# Patient Record
Sex: FEMALE | Race: WHITE
[De-identification: names, ages, dates, MRNs, and addresses within clinical notes are randomized per-mention and may not be internally consistent; named-entity substitution may affect disease eponyms.]

---

## 2018-01-24 ENCOUNTER — HOSPITAL ENCOUNTER (EMERGENCY)
Dept: HOSPITAL 62 - ER | Age: 23
Discharge: HOME | End: 2018-01-24
Payer: MEDICAID

## 2018-01-24 VITALS — DIASTOLIC BLOOD PRESSURE: 65 MMHG | SYSTOLIC BLOOD PRESSURE: 104 MMHG

## 2018-01-24 DIAGNOSIS — R50.9: ICD-10-CM

## 2018-01-24 DIAGNOSIS — R52: ICD-10-CM

## 2018-01-24 DIAGNOSIS — R05: ICD-10-CM

## 2018-01-24 DIAGNOSIS — F17.200: ICD-10-CM

## 2018-01-24 DIAGNOSIS — R11.2: Primary | ICD-10-CM

## 2018-01-24 DIAGNOSIS — J02.9: ICD-10-CM

## 2018-01-24 DIAGNOSIS — R59.0: ICD-10-CM

## 2018-01-24 PROCEDURE — 99282 EMERGENCY DEPT VISIT SF MDM: CPT

## 2018-01-24 NOTE — ER DOCUMENT REPORT
ED Medical Screen (RME)





- General


Chief Complaint: Vomiting


Stated Complaint: VOMITING


Time Seen by Provider: 01/24/18 14:01


Mode of Arrival: Ambulatory


Information source: Patient


TRAVEL OUTSIDE OF THE [place] IN LAST 30 DAYS: No





- Related Data


Allergies/Adverse Reactions: 


 





No Known Allergies Allergy (Unverified 01/24/18 12:19)


 











Doctor's Discharge





- Discharge


Clinical Impression: 


 vomiting, Sore throat





Condition: Good


Disposition: HOME, SELF-CARE


Instructions:  Sore Throat ([facility]), Acetaminophen, Penicillin V K ([facility]), Family 

Physicians / Practices


Additional Instructions: 


return to ER if worse


plenty of fluids


tylenol for discomfort


rest


Prescriptions: 


Penicillin V Potassium [Penicillin Vk 500 mg Tablet] 500 mg PO QID #40 tablet


Forms:  Return to Work

## 2018-01-24 NOTE — ER DOCUMENT REPORT
HPI





- HPI


Onset: Yesterday


Pain Level: 4


Context: 





21 yo smoker, no drugs, occ. alcohol normally healthy female with vomiting 

yesterday with bodyaches.  When woke up today still has bodyaches. No vomiting 

or diarrhea today. Fever 101. Mild sore throat and rare cough. No abd. pain.   

LMP: 1-. No hormones. No dysuria. No vaginal discharge. Mild nausea.


Associated Symptoms: None


Exacerbated by: Denies


Relieved by: Denies


Similar symptoms previously: No


Recently seen / treated by doctor: No





- ROS


ROS below otherwise negative: Yes


Systems Reviewed and Negative: Yes All other systems reviewed and negative





Past Medical History





- General


Information source: Patient





- Social History


Smoking Status: Current Every Day Smoker


Frequency of alcohol use: Occasional


Drug Abuse: None


Lives with: Spouse/Significant other


Family History: Reviewed & Not Pertinent





- Medical History


Medical History: Negative


Psychiatric Medical History: Reports: Hx Attention Deficit Hyperactivity 

Disorder


Surgical Hx: Negative





Vertical Provider Document





- CONSTITUTIONAL


Agree With Documented VS: No - apical pulse 92 at this time


Exam Limitations: No Limitations


General Appearance: No Apparent Distress





- INFECTION CONTROL


TRAVEL OUTSIDE OF THE [place] IN LAST 30 DAYS: No





- HEENT


HEENT: Normocephalic, Pharyngeal Erythema - bright red.  negative: Conjuctival 

Injection, Tympanic Membrane Red





- NECK


Neck: Supple, Lymphadenopathy-Left - ant, Lymphadenopathy-Right - ant





- RESPIRATORY


Respiratory: Breath Sounds Normal, No Respiratory Distress


O2 Sat by Pulse Oximetry: 100





- CARDIOVASCULAR


Cardiovascular: Regular Rate, Regular Rhythm





- GI/ABDOMEN


Gastrointestinal: Abdomen Soft, Abdomen Non-Tender, No Organomegaly, Normal 

Bowel Sounds





- MUSCULOSKELETAL/EXTREMETIES


Musculoskeletal/Extremeties: MAEW





- NEURO


Level of Consciousness: Awake, Alert, Appropriate





- DERM


Integumentary: Warm, Dry, No Rash





Course





- Vital Signs


Vital signs: 


 











Temp Pulse Resp BP Pulse Ox


 


 98.6 F   119 H  16   104/65   100 


 


 01/24/18 12:49  01/24/18 12:49  01/24/18 12:49  01/24/18 12:49  01/24/18 12:49














Discharge





- Discharge


Clinical Impression: 


 vomiting, Sore throat





Condition: Good


Disposition: HOME, SELF-CARE


Instructions:  Acetaminophen, Family Physicians / Practices, Penicillin V K ([facility]

), Sore Throat ([facility])


Additional Instructions: 


return to ER if worse


plenty of fluids


tylenol for discomfort


rest


Prescriptions: 


Penicillin V Potassium [Penicillin Vk 500 mg Tablet] 500 mg PO QID #40 tablet


Forms:  Return to Work

## 2020-11-07 ENCOUNTER — HOSPITAL ENCOUNTER (EMERGENCY)
Dept: HOSPITAL 53 - M ED | Age: 25
Discharge: HOME | End: 2020-11-07
Payer: MEDICAID

## 2020-11-07 VITALS — WEIGHT: 125.66 LBS | HEIGHT: 71 IN | BODY MASS INDEX: 17.59 KG/M2

## 2020-11-07 VITALS — SYSTOLIC BLOOD PRESSURE: 103 MMHG | DIASTOLIC BLOOD PRESSURE: 56 MMHG

## 2020-11-07 DIAGNOSIS — Z3A.01: ICD-10-CM

## 2020-11-07 DIAGNOSIS — O36.8911: Primary | ICD-10-CM

## 2020-11-07 DIAGNOSIS — Z87.891: ICD-10-CM

## 2020-11-07 LAB
B-HCG SERPL-ACNC: 9248 MIU/ML
BACTERIA URNS QL MICRO: (no result)
BASOPHILS # BLD AUTO: 0 10^3/UL (ref 0–0.2)
BASOPHILS NFR BLD AUTO: 0.1 % (ref 0–1)
BILIRUB UR QL STRIP: (no result)
BUN SERPL-MCNC: 11 MG/DL (ref 7–18)
CALCIUM SERPL-MCNC: 8.8 MG/DL (ref 8.5–10.1)
CHLORIDE SERPL-SCNC: 104 MEQ/L (ref 98–107)
CO2 SERPL-SCNC: 28 MEQ/L (ref 21–32)
CREAT SERPL-MCNC: 0.77 MG/DL (ref 0.55–1.3)
EOSINOPHIL # BLD AUTO: 0.1 10^3/UL (ref 0–0.5)
EOSINOPHIL NFR BLD AUTO: 1 % (ref 0–3)
GFR SERPL CREATININE-BSD FRML MDRD: > 60 ML/MIN/{1.73_M2} (ref 60–?)
GLUCOSE SERPL-MCNC: 84 MG/DL (ref 70–100)
GLUCOSE UR STRIP-MCNC: NEGATIVE MG/DL
HCT VFR BLD AUTO: 36.3 % (ref 36–47)
HGB BLD-MCNC: 12.4 G/DL (ref 12–15.5)
HYALINE CASTS URNS QL MICRO: (no result) /LPF (ref 0–1)
KETONES UR QL STRIP: (no result) MG/DL
LYMPHOCYTES # BLD AUTO: 2 10^3/UL (ref 1.5–5)
LYMPHOCYTES NFR BLD AUTO: 26.3 % (ref 24–44)
MCH RBC QN AUTO: 32.6 PG (ref 27–33)
MCHC RBC AUTO-ENTMCNC: 34.2 G/DL (ref 32–36.5)
MCV RBC AUTO: 95.5 FL (ref 80–96)
MONOCYTES # BLD AUTO: 0.4 10^3/UL (ref 0–0.8)
MONOCYTES NFR BLD AUTO: 5.3 % (ref 0–5)
NEUTROPHILS # BLD AUTO: 5.2 10^3/UL (ref 1.5–8.5)
NEUTROPHILS NFR BLD AUTO: 66.9 % (ref 36–66)
PLATELET # BLD AUTO: 212 10^3/UL (ref 150–450)
POTASSIUM SERPL-SCNC: 3.8 MEQ/L (ref 3.5–5.1)
RBC # BLD AUTO: 3.8 10^6/UL (ref 4–5.4)
RBC #/AREA URNS HPF: (no result) /HPF (ref 0–3)
SODIUM SERPL-SCNC: 136 MEQ/L (ref 136–145)
SQUAMOUS URNS QL MICRO: (no result) /HPF
UROBILINOGEN UR QL STRIP: (no result) MG/DL
WBC # BLD AUTO: 7.8 10^3/UL (ref 4–10)

## 2020-11-07 NOTE — REP
INDICATION:

vaginal bleeding, + pregnancy.



COMPARISON:

None.



TECHNIQUE:

Transabdominal scanning



FINDINGS:

The uterus is anteverted.  It measures 9.2 x 5.7 by 6.2 cm.  This is a gestational sac

in the fundus.  Within it is a fetal pole with a crown-rump length of 2 mm

corresponding to 5 weeks 5 days.  Fetal heart activity at 88 BPM.  On the left lateral

aspect of the gestational sac is a subchorionic bleed measuring 2.1 x 1.7 x 1.1 cm.

There is a yolk sac evident.



No fluid in the endocervical canal suggested.  There is no free fluid in the

cul-de-sac.  Right ovary not observed.  The left ovary shows no mass or enlargement.

No adjacent fluid.



IMPRESSION:

1. Single intrauterine gestation at 5 weeks 5 days by composite ultrasound criteria

and LMP.  This gives an EDC of 07/05/2021.

2. Fetal heart activity 888 BPM

3. Subchorionic bleed to the left of the gestational sac 2.1 x 1.7 x 1.1 cm.  No other

findings.





<Electronically signed by Todd Mike > 11/07/20 [id number]

## 2020-11-17 ENCOUNTER — HOSPITAL ENCOUNTER (OUTPATIENT)
Dept: HOSPITAL 53 - M LAB REF | Age: 25
End: 2020-11-17
Attending: ADVANCED PRACTICE MIDWIFE
Payer: MEDICAID

## 2020-11-17 DIAGNOSIS — Z34.81: Primary | ICD-10-CM

## 2020-11-17 LAB
HBV SURFACE AB SER-ACNC: NEGATIVE M[IU]/ML
HCT VFR BLD AUTO: 38.3 % (ref 36–47)
HCV AB SER QL: < 0 INDEX (ref ?–0.8)
HGB BLD-MCNC: 12.7 G/DL (ref 12–15.5)
HIV 1+2 AB+HIV1 P24 AG SERPL QL IA: NEGATIVE
MCH RBC QN AUTO: 32.4 PG (ref 27–33)
MCHC RBC AUTO-ENTMCNC: 33.2 G/DL (ref 32–36.5)
MCV RBC AUTO: 97.7 FL (ref 80–96)
PLATELET # BLD AUTO: 218 10^3/UL (ref 150–450)
RBC # BLD AUTO: 3.92 10^6/UL (ref 4–5.4)
WBC # BLD AUTO: 7.6 10^3/UL (ref 4–10)

## 2020-11-20 ENCOUNTER — HOSPITAL ENCOUNTER (OUTPATIENT)
Dept: HOSPITAL 53 - M RAD | Age: 25
End: 2020-11-20
Attending: ADVANCED PRACTICE MIDWIFE
Payer: MEDICAID

## 2020-11-20 DIAGNOSIS — Z3A.01: ICD-10-CM

## 2020-11-20 DIAGNOSIS — O20.9: Primary | ICD-10-CM

## 2021-01-15 ENCOUNTER — HOSPITAL ENCOUNTER (OUTPATIENT)
Dept: HOSPITAL 53 - M LAB REF | Age: 26
End: 2021-01-15
Attending: ADVANCED PRACTICE MIDWIFE
Payer: MEDICAID

## 2021-01-15 DIAGNOSIS — Z34.82: Primary | ICD-10-CM

## 2021-02-16 ENCOUNTER — HOSPITAL ENCOUNTER (OUTPATIENT)
Dept: HOSPITAL 53 - M RAD | Age: 26
End: 2021-02-16
Attending: ADVANCED PRACTICE MIDWIFE
Payer: MEDICAID

## 2021-02-16 DIAGNOSIS — Z34.82: Primary | ICD-10-CM

## 2021-03-29 ENCOUNTER — HOSPITAL ENCOUNTER (OUTPATIENT)
Dept: HOSPITAL 53 - M RAD | Age: 26
End: 2021-03-29
Attending: ADVANCED PRACTICE MIDWIFE
Payer: COMMERCIAL

## 2021-03-29 DIAGNOSIS — Z34.92: Primary | ICD-10-CM

## 2021-03-29 DIAGNOSIS — Z3A.26: ICD-10-CM

## 2021-03-29 NOTE — REP
INDICATION:

ANATOMY F/U



COMPARISON:

02/16/2021



TECHNIQUE:

Transabdominal obstetrical ultrasound with color Doppler evaluation.



FINDINGS:

Examination demonstrates a single live intrauterine pregnancy in cephalic

presentation.  Fetal motion is identified by technologist.  Placenta is noted

anterior/right lateral and  grade 1 without evidence for placenta previa or abruption.

Amniotic fluid volume is normal.  Cervix measures 5.8 cm in length and appears closed..



Gestational age by LMP and 1st ultrasound 26 weeks 0 days with FANTASMA 07/05/2021.

Gestational age by current measurements 27 weeks 1 day with FANTASMA 06/27/2021.



FHR equals 133 beats per minute.



Estimated fetal weight 1024 grams (83rdpercentile).



Anatomical assessment demonstrates normal structures including left kidney, bladder,

and spine.  Two vessel cord again noted.



IMPRESSION:

1. Appropriate interval growth.

2. 2 vessel cord again noted.

3. Images of the spine now complete and normal.





<Electronically signed by Fabiano Swann > 03/29/21 [id number]

## 2021-04-14 ENCOUNTER — HOSPITAL ENCOUNTER (OUTPATIENT)
Dept: HOSPITAL 53 - M LAB | Age: 26
End: 2021-04-14
Attending: ADVANCED PRACTICE MIDWIFE
Payer: COMMERCIAL

## 2021-04-14 DIAGNOSIS — Z34.82: Primary | ICD-10-CM

## 2021-04-14 LAB
HCT VFR BLD AUTO: 33.4 % (ref 36–47)
HGB BLD-MCNC: 11.7 G/DL (ref 12–15.5)
MCH RBC QN AUTO: 33.9 PG (ref 27–33)
MCHC RBC AUTO-ENTMCNC: 35 G/DL (ref 32–36.5)
MCV RBC AUTO: 96.8 FL (ref 80–96)
PLATELET # BLD AUTO: 188 10^3/UL (ref 150–450)
RBC # BLD AUTO: 3.45 10^6/UL (ref 4–5.4)
WBC # BLD AUTO: 9.9 10^3/UL (ref 4–10)

## 2021-05-10 ENCOUNTER — HOSPITAL ENCOUNTER (EMERGENCY)
Dept: HOSPITAL 53 - M ED | Age: 26
Discharge: LEFT BEFORE DISCHARGE | End: 2021-05-10
Payer: COMMERCIAL

## 2021-05-10 VITALS
SYSTOLIC BLOOD PRESSURE: 112 MMHG | HEIGHT: 70 IN | BODY MASS INDEX: 20.99 KG/M2 | WEIGHT: 146.61 LBS | DIASTOLIC BLOOD PRESSURE: 57 MMHG

## 2021-05-10 DIAGNOSIS — Z53.21: Primary | ICD-10-CM

## 2021-06-03 ENCOUNTER — HOSPITAL ENCOUNTER (OUTPATIENT)
Dept: HOSPITAL 53 - M LAB REF | Age: 26
End: 2021-06-03
Attending: ADVANCED PRACTICE MIDWIFE
Payer: MEDICAID

## 2021-06-03 DIAGNOSIS — Z34.83: Primary | ICD-10-CM

## 2021-06-12 ENCOUNTER — HOSPITAL ENCOUNTER (OUTPATIENT)
Dept: HOSPITAL 53 - M LDO | Age: 26
Discharge: HOME | End: 2021-06-12
Attending: OBSTETRICS & GYNECOLOGY
Payer: MEDICAID

## 2021-06-12 VITALS — DIASTOLIC BLOOD PRESSURE: 53 MMHG | SYSTOLIC BLOOD PRESSURE: 98 MMHG

## 2021-06-12 VITALS — BODY MASS INDEX: 20.52 KG/M2 | WEIGHT: 146.61 LBS | HEIGHT: 71 IN

## 2021-06-12 VITALS — SYSTOLIC BLOOD PRESSURE: 112 MMHG | DIASTOLIC BLOOD PRESSURE: 64 MMHG

## 2021-06-12 VITALS — DIASTOLIC BLOOD PRESSURE: 59 MMHG | SYSTOLIC BLOOD PRESSURE: 115 MMHG

## 2021-06-12 DIAGNOSIS — Z3A.36: ICD-10-CM

## 2021-06-12 DIAGNOSIS — O47.03: Primary | ICD-10-CM

## 2021-06-12 NOTE — IPNPDOC
Text Note


Date of Service


The patient was seen on 21.





NOTE


Triage Note





Pearl is a 26yo  with SIUP at 36w2d presenting for ctx that started 

around 3pm this afternoon. She had a baby shower earlier in the day and believes

she has been hydrating well. No LOF, no vaginal bleeding. Feels good fetal 

movement. 





Vitals wnl, afebrile


Gen: WDWN, resting comfortably in bed


Abdomen: soft, gravid, NTTP


Extremities: no edema BLE





SCE: 2/thick/-2, posterior





Cat I FHRT with +accels, -decels, mod karen


[place]: irregular ctx





Assessment: 


Pearl is a 26yo  with SIUP at 36w2d with NO e/o active labor. SCE 

2/thick/-2, posterior. Irregular ctx on toco. Reassuring fetal assessment.





Plan: 


-Safe for discharge home


-follow up at routine OB visit this week


-discussed return precautions at length


-continue to hydrate well





Araseli Bell MD





VS,Mohini, I+O


VSMohini, I+O





Vital Signs








  Date Time  Temp Pulse Resp B/P (MAP) Pulse Ox O2 Delivery O2 Flow Rate FiO2


 


21 18:27  85 20 98/53 (68)    


 


21 18:00 97.8       


 


21 17:45     98 Room Air  

















Araseli Bell MD           2021 18:47

## 2021-06-18 ENCOUNTER — HOSPITAL ENCOUNTER (OUTPATIENT)
Dept: HOSPITAL 53 - M LDO | Age: 26
Discharge: HOME | End: 2021-06-18
Attending: ADVANCED PRACTICE MIDWIFE
Payer: MEDICAID

## 2021-06-18 VITALS — HEIGHT: 71 IN | BODY MASS INDEX: 20.4 KG/M2 | WEIGHT: 145.73 LBS

## 2021-06-18 VITALS — DIASTOLIC BLOOD PRESSURE: 72 MMHG | SYSTOLIC BLOOD PRESSURE: 115 MMHG

## 2021-06-18 VITALS — SYSTOLIC BLOOD PRESSURE: 116 MMHG | DIASTOLIC BLOOD PRESSURE: 54 MMHG

## 2021-06-18 DIAGNOSIS — O47.1: Primary | ICD-10-CM

## 2021-06-18 DIAGNOSIS — Z3A.37: ICD-10-CM

## 2021-06-18 NOTE — IPNPDOC
Text Note


Date of Service


The patient was seen on 21.





NOTE


Outpatient





24yo  FANTASMA 2021. Presents @ 37w4d with complaints of contractions. Pt 

has been evaluated frequently over the last few weeks for similar reports.


SVE today in office 3/50/-. 





Cat I fetal tracing.


UC 5-7 minutes apart x 60 seconds, mild


SVE 3/50/-2, unchanged. scant show on glove.





Offered IV hydration with sedation. Pt declined


Desires discharge. Routine precautions reviewed. Keep appt next week.





VS,Fishbone, I+O


VS, Fishbone, I+O





Vital Signs








  Date Time  Temp Pulse Resp B/P (MAP) Pulse Ox O2 Delivery O2 Flow Rate FiO2


 


21 20:21 98.2 85 16 115/72 (86) 99 Room Air  

















Yelena Stewart CNM  2021 21:19

## 2021-06-19 ENCOUNTER — HOSPITAL ENCOUNTER (INPATIENT)
Dept: HOSPITAL 53 - M LDO | Age: 26
LOS: 2 days | Discharge: HOME | DRG: 560 | End: 2021-06-21
Attending: OBSTETRICS & GYNECOLOGY | Admitting: OBSTETRICS & GYNECOLOGY
Payer: MEDICAID

## 2021-06-19 VITALS — SYSTOLIC BLOOD PRESSURE: 122 MMHG | DIASTOLIC BLOOD PRESSURE: 65 MMHG

## 2021-06-19 VITALS — BODY MASS INDEX: 20.68 KG/M2 | HEIGHT: 71 IN | WEIGHT: 147.71 LBS

## 2021-06-19 DIAGNOSIS — Z3A.37: ICD-10-CM

## 2021-06-20 VITALS — SYSTOLIC BLOOD PRESSURE: 116 MMHG | DIASTOLIC BLOOD PRESSURE: 58 MMHG

## 2021-06-20 VITALS — DIASTOLIC BLOOD PRESSURE: 65 MMHG | SYSTOLIC BLOOD PRESSURE: 114 MMHG

## 2021-06-20 VITALS — SYSTOLIC BLOOD PRESSURE: 128 MMHG | DIASTOLIC BLOOD PRESSURE: 69 MMHG

## 2021-06-20 VITALS — DIASTOLIC BLOOD PRESSURE: 58 MMHG | SYSTOLIC BLOOD PRESSURE: 109 MMHG

## 2021-06-20 VITALS — SYSTOLIC BLOOD PRESSURE: 121 MMHG | DIASTOLIC BLOOD PRESSURE: 66 MMHG

## 2021-06-20 VITALS — SYSTOLIC BLOOD PRESSURE: 113 MMHG | DIASTOLIC BLOOD PRESSURE: 55 MMHG

## 2021-06-20 VITALS — SYSTOLIC BLOOD PRESSURE: 112 MMHG | DIASTOLIC BLOOD PRESSURE: 56 MMHG

## 2021-06-20 VITALS — SYSTOLIC BLOOD PRESSURE: 112 MMHG | DIASTOLIC BLOOD PRESSURE: 59 MMHG

## 2021-06-20 VITALS — SYSTOLIC BLOOD PRESSURE: 108 MMHG | DIASTOLIC BLOOD PRESSURE: 59 MMHG

## 2021-06-20 VITALS — DIASTOLIC BLOOD PRESSURE: 58 MMHG | SYSTOLIC BLOOD PRESSURE: 108 MMHG

## 2021-06-20 VITALS — SYSTOLIC BLOOD PRESSURE: 106 MMHG | DIASTOLIC BLOOD PRESSURE: 64 MMHG

## 2021-06-20 VITALS — SYSTOLIC BLOOD PRESSURE: 109 MMHG | DIASTOLIC BLOOD PRESSURE: 61 MMHG

## 2021-06-20 VITALS — SYSTOLIC BLOOD PRESSURE: 120 MMHG | DIASTOLIC BLOOD PRESSURE: 56 MMHG

## 2021-06-20 VITALS — SYSTOLIC BLOOD PRESSURE: 106 MMHG | DIASTOLIC BLOOD PRESSURE: 55 MMHG

## 2021-06-20 VITALS — SYSTOLIC BLOOD PRESSURE: 116 MMHG | DIASTOLIC BLOOD PRESSURE: 67 MMHG

## 2021-06-20 VITALS — SYSTOLIC BLOOD PRESSURE: 97 MMHG | DIASTOLIC BLOOD PRESSURE: 53 MMHG

## 2021-06-20 VITALS — SYSTOLIC BLOOD PRESSURE: 114 MMHG | DIASTOLIC BLOOD PRESSURE: 64 MMHG

## 2021-06-20 VITALS — DIASTOLIC BLOOD PRESSURE: 73 MMHG | SYSTOLIC BLOOD PRESSURE: 118 MMHG

## 2021-06-20 VITALS — DIASTOLIC BLOOD PRESSURE: 87 MMHG | SYSTOLIC BLOOD PRESSURE: 132 MMHG

## 2021-06-20 VITALS — DIASTOLIC BLOOD PRESSURE: 61 MMHG | SYSTOLIC BLOOD PRESSURE: 113 MMHG

## 2021-06-20 VITALS — SYSTOLIC BLOOD PRESSURE: 115 MMHG | DIASTOLIC BLOOD PRESSURE: 70 MMHG

## 2021-06-20 VITALS — SYSTOLIC BLOOD PRESSURE: 122 MMHG | DIASTOLIC BLOOD PRESSURE: 58 MMHG

## 2021-06-20 VITALS — SYSTOLIC BLOOD PRESSURE: 112 MMHG | DIASTOLIC BLOOD PRESSURE: 63 MMHG

## 2021-06-20 VITALS — SYSTOLIC BLOOD PRESSURE: 105 MMHG | DIASTOLIC BLOOD PRESSURE: 62 MMHG

## 2021-06-20 VITALS — SYSTOLIC BLOOD PRESSURE: 123 MMHG | DIASTOLIC BLOOD PRESSURE: 53 MMHG

## 2021-06-20 VITALS — DIASTOLIC BLOOD PRESSURE: 61 MMHG | SYSTOLIC BLOOD PRESSURE: 112 MMHG

## 2021-06-20 VITALS — SYSTOLIC BLOOD PRESSURE: 111 MMHG | DIASTOLIC BLOOD PRESSURE: 66 MMHG

## 2021-06-20 VITALS — SYSTOLIC BLOOD PRESSURE: 117 MMHG | DIASTOLIC BLOOD PRESSURE: 65 MMHG

## 2021-06-20 VITALS — DIASTOLIC BLOOD PRESSURE: 68 MMHG | SYSTOLIC BLOOD PRESSURE: 116 MMHG

## 2021-06-20 VITALS — SYSTOLIC BLOOD PRESSURE: 116 MMHG | DIASTOLIC BLOOD PRESSURE: 59 MMHG

## 2021-06-20 VITALS — DIASTOLIC BLOOD PRESSURE: 53 MMHG | SYSTOLIC BLOOD PRESSURE: 98 MMHG

## 2021-06-20 VITALS — SYSTOLIC BLOOD PRESSURE: 101 MMHG | DIASTOLIC BLOOD PRESSURE: 53 MMHG

## 2021-06-20 VITALS — DIASTOLIC BLOOD PRESSURE: 54 MMHG | SYSTOLIC BLOOD PRESSURE: 95 MMHG

## 2021-06-20 VITALS — SYSTOLIC BLOOD PRESSURE: 115 MMHG | DIASTOLIC BLOOD PRESSURE: 69 MMHG

## 2021-06-20 LAB
BASE EXCESS BLDCOA CALC-SCNC: -2.2 MMOL/L
BASE EXCESS BLDCOV CALC-SCNC: 1 MMOL/L
CO2 BLDCOA CALC-SCNC: 25.3 MEQ/L
CO2 BLDCOV CALC-SCNC: 28.1 MEQ/L
HCO3 STD BLDCOA-SCNC: 21.6 MEQ/L
HCO3 STD BLDCOA-SCNC: 23.9 MEQ/L
HCO3 STD BLDCOV-SCNC: 24.9 MEQ/L
HCO3 STD BLDCOV-SCNC: 26.7 MEQ/L
HCT VFR BLD AUTO: 37.1 % (ref 36–47)
HGB BLD-MCNC: 13.2 G/DL (ref 12–15.5)
MCH RBC QN AUTO: 33.9 PG (ref 27–33)
MCHC RBC AUTO-ENTMCNC: 35.6 G/DL (ref 32–36.5)
MCV RBC AUTO: 95.4 FL (ref 80–96)
PCO2 BLDCOA: 45.8 MMHG
PCO2 BLDCOV: 46.3 MMHG
PH BLDCOA: 7.34 UNITS
PH BLDCOV: 7.38 UNITS
PLATELET # BLD AUTO: 194 10^3/UL (ref 150–450)
PO2 BLDCOA: 21.6 MMHG
PO2 BLDCOV: 31.9 MMHG
RBC # BLD AUTO: 3.89 10^6/UL (ref 4–5.4)
SAO2 % BLDCOA: 53.8 %
SAO2 % BLDCOV: 80.2 %
WBC # BLD AUTO: 16.2 10^3/UL (ref 4–10)

## 2021-06-20 PROCEDURE — 10907ZC DRAINAGE OF AMNIOTIC FLUID, THERAPEUTIC FROM PRODUCTS OF CONCEPTION, VIA NATURAL OR ARTIFICIAL OPENING: ICD-10-PCS | Performed by: OBSTETRICS & GYNECOLOGY

## 2021-06-20 RX ADMIN — Medication SCH TAB: at 09:17

## 2021-06-20 NOTE — HPE
HISTORY AND PHYSICAL



DATE OF ADMISSION:  2021



HISTORY OF PRESENT ILLNESS:  The patient is a 25-year-old female  2,

para 1-0-0-1 with an estimated date of confinement (EDC) of 2021,

estimated gestational age (EGA) 37-6/7 weeks gestation who presented to labor

and delivery with complaints of contractions every 4-5 minutes.  Patient was

there yesterday, was found to be 3 cm.  Upon evaluation, she was found to be in

active labor.  At this point, a decision was made for admission.  Her prenatal

record was reviewed, which was essentially unremarkable.  



PRENATAL LABORATORIES:

Blood type O positive.  Rubella immune.  Hepatitis negative.  Human

immunodeficiency virus (HIV) negative.  Gonorrhea and chlamydia negative. 

One-hour glucose testing was within normal limits.  Her group B Streptococcus

(GBS) is negative.



PAST MEDICAL HISTORY:  Denies.



PAST SURGICAL HISTORY:  [place] tooth extraction.



SOCIAL HISTORY:   She is  but .  Denies any alcohol or drugs. 

She is a former smoker and former drug abuser.



MEDICATIONS:  None other than prenatal vitamin.



ALLERGIES:  No known drug allergies.



PHYSICAL EXAMINATION:  

HEENT:  Grossly within normal limits.

ABDOMEN:  Soft, nontender, nondistended. 

EXTREMITIES:  No clubbing, cyanosis or edema.

VAGINAL EXAM:  5 cm dilated, 80% effaced, fetus at -2 station, vertex position.

TRACING:  Tracing reviewed.  Category 1 tracing with contractions every 4-5

minutes.



ASSESSMENT:  Intrauterine pregnancy at 37-6/7 weeks' gestation in active labor.



PLAN:  Admit to labor and delivery.  Pain management discussed.  Patient opted

for an epidural.  Will continue to monitor.  Anticipate delivery.

## 2021-06-20 NOTE — DN
DELIVERY NOTE



DATE OF DELIVERY: 2021

TIME OF BIRTH: 



GENDER: Female

APGARS: 8 and 9.



LACERATIONS: 



ANESTHESIA: 



ESTIMATED BLOOD LOSS: 300 mL.



COUNTS: 



DESCRIPTION OF DELIVERY: Pearl is a 25-year-old female  2, para

1,0,0,1 who was admitted at 37-6/7 weeks gestation in active labor. She

progressed to fully dilated and pushing. Artificial rupture of membranes was

done for clear fluid. She then delivered a live female infant in left occiput

anterior position over intact perineum, Apgars 8 and 9, birth weight 6 pounds

11 ounces. Placenta delivered manually intact, three vessel cord. Both mother

and baby in stable condition.







cc: [facility]



Edited: anastacio 2021 [id number]

MTDD

## 2021-06-21 VITALS — SYSTOLIC BLOOD PRESSURE: 131 MMHG | DIASTOLIC BLOOD PRESSURE: 64 MMHG

## 2021-06-21 RX ADMIN — Medication SCH TAB: at 09:10

## 2021-06-21 NOTE — IPNPDOC
Postpartum Progress Note


Date of Service:  2021


Postpartum Day#:  1


Postpartum Progress Note


PPD1





SUBJECT: Pearl is a 26yo  s/p uncomplicated  after presenting in 

active labor at term, doing well postpartum day # 1. She has been ambulating, 

voiding spontaneously without issue and tolerating regular diet. Bottle feeding 

without issue. Reports lochia is like a normal period. No f/c/CP/SOB.





OBJECTIVE: 


VITAL SIGNS: Within normal limits, afebrile.


Alert and oriented times three.


Abdomen: Fundus firm at U-2. Soft, NTTP.


Extremities: no pain with palpation of calves





ASSESSMENT: Pearl is a 26yo  s/p uncomplicated  after presenting in 

active labor at term, doing well postpartum day # 1. Vitals within normal 

limits, afebrile, hemodynamically stable with no evidence of infection.





PLAN:


1. Discharge to home today.


2. Tylenol and Motrin for pain. Colace for bowel regimen.


3. Encourage ambulation and good hydration


4. Desires OCP and plan is for vasectomy, will re-address at pp visit


5. Routine PP visit in 6 weeks in clinic.


6. Discussed return precautions at length.





Araseli Bell MD





VS, I&O, 24H, Fishbone


Vital Signs/I&O





Vital Signs








  Date Time  Temp Pulse Resp B/P (MAP) Pulse Ox O2 Delivery O2 Flow Rate FiO2


 


21 06:00 97.9 75 18 131/64 (86) 99 Room Air  














I&O- Last 24 Hours up to 6 AM 


 


 21





 06:00


 


Intake Total 2146 ml


 


Output Total 2550 ml


 


Balance -404 ml

















Araseli Bell MD           2021 09:09

## 2021-06-21 NOTE — DS.PDOC
Discharge Summary


General


Date of Admission


2021 at 23:49


Date of Discharge


2021


Attending Physician:  Basim Lucio DO





Discharge Summary


PROCEDURES PERFORMED DURING STAY: spontaneous vaginal delivery





ADMITTING DIAGNOSES: 


1. active labor at term





DISCHARGE DIAGNOSES:


1. active labor at term





COMPLICATIONS/CHIEF COMPLAINT: LABOR.





HISTORY OF PRESENT ILLNESS/HOSPITAL COURSE:


Pearl is a 24yo  s/p uncomplicated  after presenting in active labor 

at term, doing well postpartum day # 1. She has had a benign postpartum course. 

At time of discharge, vitals within normal limits, afebrile, hemodynamically 

stable with no evidence of infection.





DISCHARGE MEDICATIONS: Please see below.


 


ALLERGIES: Please see below.





PHYSICAL EXAMINATION ON DISCHARGE:


VITAL SIGNS: Within normal limits, afebrile.


Alert and oriented times three.


Abdomen: Fundus firm at U-2. Soft, NTTP.


Extremities: no pain with palpation of calves





LABORATORY DATA: Please see below.





DISCHARGE INSTRUCTIONS:


1. Discharge to home today.


2. Tylenol and Motrin for pain. Colace for bowel regimen.


3. Encourage ambulation and good hydration


4. Desires OCP and plan is for vasectomy, will re-address at pp visit


5. Routine PP visit in 6 weeks in clinic.


6. Discussed return precautions at length.





DISCHARGE CONDITION: Stable





TIME SPENT ON DISCHARGE: Greater than 20 minutes.





Vital Signs/I&Os





Vital Signs








  Date Time  Temp Pulse Resp B/P (MAP) Pulse Ox O2 Delivery O2 Flow Rate FiO2


 


21 06:00 97.9 75 18 131/64 (86) 99 Room Air  














I&O- Last 24 Hours up to 6 AM 


 


 21





 06:00


 


Intake Total 2146 ml


 


Output Total 2550 ml


 


Balance -404 ml











Discharge Medications


Scheduled


Prenatal No.137/Iron/Folic Acd (Prenatal Vitamin Tablet) 1 Each Tablet, 1 TAB PO

DAILY, (Reported)





Scheduled PRN


Acetaminophen (Acetaminophen) 500 Mg Tablet, 1,000 MG PO Q6HP PRN for PAIN LEVEL

6-10


Calcium Carbonate (Tums) 300 Mg Tab.chew, 1 TAB PO Q4HP PRN for INDIGESTION, 

(Reported)


Docusate Sodium (Dok) 100 Mg Capsule, 100 MG PO BIDP PRN for CONSTIPATION


Ondansetron HCl (Zofran) 4 Mg Tablet, 1 TAB SL Q6HP PRN for nausea/vomiting, 

(Reported)





Allergies


Coded Allergies:  


     No Known Allergies (Unverified , 20)











Araseli Bell MD           2021 09:20

## 2023-05-25 NOTE — REP
INDICATION:

ENCOUNTER FOR SUPRVSN OF NORMAL PREGNACY



COMPARISON:

None.



TECHNIQUE:

Transabdominal obstetrical ultrasound with color Doppler evaluation.



FINDINGS:

Examination demonstrates a single live intrauterine pregnancy in breech presentation.

Fetal motion is identified by technologist.  Placenta is noted anterior/right lateral

and  grade 1 without evidence for placenta previa or abruption.  Amniotic fluid volume

is normal.  Cervix measures 4.5 cm in length and appears closed..



Gestational age by LMP 20 weeks 1 day with FANTASMA 07/05/2021.

Gestational age by current measurements 20 weeks 6 days with FANTASMA 06/30/2021.



FHR equals  beats per minute.



BPD:      5.1 cm 21 weeks 3 days

HC:         18.2 cm there is 20 weeks 4 days

AC:         16.6 cm 21 weeks 4 days

FL:          3.3 cm 20 weeks 1 day

HL:          3.1 cm 20 weeks 3 days

HC/AC: 1.10

Estimated fetal weight 389 grams.



Anatomical assessment demonstrates normal structures including cranium, choroid

plexus, cavum, cerebellum/posterior fossa, facial features, lungs, four-chamber

heart/ventricular outflow tracts, diaphragm, stomach, cord insertion, kidneys/bladder,

and extremities.



Limited evaluation of the spine due to positioning.

Single umbilical artery noted within the cord.



IMPRESSION:

Single live intrauterine pregnancy in breech presentation demonstrating appropriate

interval growth.  Anatomical findings as described above may warrant follow-up.





<Electronically signed by Fabiano Swann > 02/16/21 0837
normal (ped)...